# Patient Record
Sex: FEMALE | Race: WHITE
[De-identification: names, ages, dates, MRNs, and addresses within clinical notes are randomized per-mention and may not be internally consistent; named-entity substitution may affect disease eponyms.]

---

## 2019-02-15 ENCOUNTER — HOSPITAL ENCOUNTER (OUTPATIENT)
Dept: HOSPITAL 53 - M LABDRAW1 | Age: 74
End: 2019-02-15
Attending: ORTHOPAEDIC SURGERY
Payer: MEDICARE

## 2019-02-15 DIAGNOSIS — M13.851: Primary | ICD-10-CM

## 2019-02-15 LAB
INR PPP: 0.94
PROTHROMBIN TIME: 12.7 SECONDS (ref 12.1–14.4)

## 2019-02-15 NOTE — HPE
DATE OF ADMISSION:  02/22/2019

 

HISTORY OF PRESENT ILLNESS:  This is a pleasant female with continuing

symptomatic right hip osteoarthritis.  She has consented for a right total hip

arthroplasty per Dr. Héctor Reeves.  Medical optimization per Dr. Mala Fair.  X-rays are consistent with advanced osteoarthritis.

 

ALLERGIES:

-  BIAXIN

 

MEDICATIONS:

-  citalopram

-  hydrobromide 20 mg one by mouth every day

-  Benadryl Allergy 25 mg one to two caps by mouth every 4-6 hours as needed

-  glucosamine

-  Aleve 220 mg which she should discontinue as coached

-  rosuvastatin calcium 20 mg one by mouth every day

 

MEDICAL PROBLEM LIST:

1.  Symptomatic right hip osteoarthritis.

2.  Anxiety state.

3.  History of malignant neoplasm of colon.

4.  History of primary malignant neoplasm of the breast.

5.  Primary osteoarthritis pelvic region and thigh.

6.  Prosthetic arthroplasty left hip.

7.  Hyperlipidemia.

 

SURGICAL HISTORY:

1.  Left total hip arthroplasty.

2.  Breast lumpectomy.

3.  Colon cancer removed.

4.  Colonoscopy in 2014.

 

FAMILY HISTORY:  Positive for myocardial infarction (MI), breast cancer.

 

SOCIAL HISTORY:  She is a former smoker in college 50 years ago.  Occasionally

consumes alcohol.  Denies illicit drugs.

 

REVIEW OF SYSTEMS:  Denies chest pain, shortness of breath, dyspnea on exertion,

fever, chills, malaise, upper respiratory or urinary tract symptoms.

 

PHYSICAL EXAMINATION:

Vitals:  Height 5 feet and 1/2 inches.  Weight 160.2.  Temperature 97.6.

She is a pleasant, well developed, well nourished, overweight female in no acute

distress, alert and oriented times three.  Mood and affect are appropriate.

Normocephalic.

Neck supple.  Negative jugular venous distention (JVD) or bruits.

Lungs:  Clear to auscultation.

Chest:  Regular rate and rhythm.

Abdomen:  Soft.  Nontender times four, sounds times two.

Right hip range of motion is limited and antalgic throughout range of motion.

Bilateral lower extremities with skin intact, benign, noninfectious looking.

 

IMPRESSION:

1.  Symptomatic right hip osteoarthritis.

2.  Patient consented for a right total hip arthroplasty per Dr. Héctro Reeves.

3.  Medical optimization per Dr. Mala Fair.

4.  On call to operating room (OR) 2 grams IV Kefzol in OR.

5.  Sequential compression stockings and thromboembolic deterrent stockings in

OR.

MTDD

## 2019-02-22 ENCOUNTER — HOSPITAL ENCOUNTER (INPATIENT)
Dept: HOSPITAL 53 - M OR | Age: 74
LOS: 5 days | Discharge: HOME | DRG: 470 | End: 2019-02-27
Attending: ORTHOPAEDIC SURGERY | Admitting: ORTHOPAEDIC SURGERY
Payer: MEDICARE

## 2019-02-22 VITALS — DIASTOLIC BLOOD PRESSURE: 74 MMHG | SYSTOLIC BLOOD PRESSURE: 134 MMHG

## 2019-02-22 VITALS — BODY MASS INDEX: 31.6 KG/M2 | HEIGHT: 61 IN | WEIGHT: 167.4 LBS

## 2019-02-22 VITALS — SYSTOLIC BLOOD PRESSURE: 97 MMHG | DIASTOLIC BLOOD PRESSURE: 60 MMHG

## 2019-02-22 DIAGNOSIS — Z79.899: ICD-10-CM

## 2019-02-22 DIAGNOSIS — F41.9: ICD-10-CM

## 2019-02-22 DIAGNOSIS — Z87.891: ICD-10-CM

## 2019-02-22 DIAGNOSIS — E66.9: ICD-10-CM

## 2019-02-22 DIAGNOSIS — Z85.3: ICD-10-CM

## 2019-02-22 DIAGNOSIS — Z96.642: ICD-10-CM

## 2019-02-22 DIAGNOSIS — Z90.49: ICD-10-CM

## 2019-02-22 DIAGNOSIS — E78.5: ICD-10-CM

## 2019-02-22 DIAGNOSIS — I48.91: ICD-10-CM

## 2019-02-22 DIAGNOSIS — Z85.038: ICD-10-CM

## 2019-02-22 DIAGNOSIS — M16.11: Primary | ICD-10-CM

## 2019-02-22 DIAGNOSIS — G47.00: ICD-10-CM

## 2019-02-22 LAB
ALBUMIN SERPL BCG-MCNC: 2.8 GM/DL (ref 3.2–5.2)
ALT SERPL W P-5'-P-CCNC: 10 U/L (ref 12–78)
BILIRUB SERPL-MCNC: 0.7 MG/DL (ref 0.2–1)
BUN SERPL-MCNC: 9 MG/DL (ref 7–18)
CALCIUM SERPL-MCNC: 8.3 MG/DL (ref 8.8–10.2)
CHLORIDE SERPL-SCNC: 107 MEQ/L (ref 98–107)
CO2 SERPL-SCNC: 28 MEQ/L (ref 21–32)
CREAT SERPL-MCNC: 0.75 MG/DL (ref 0.55–1.3)
FT4I SERPL CALC-MCNC: 2.8 % (ref 1.3–4.8)
GFR SERPL CREATININE-BSD FRML MDRD: > 60 ML/MIN/{1.73_M2} (ref 39–?)
GLUCOSE SERPL-MCNC: 98 MG/DL (ref 70–100)
HCT VFR BLD AUTO: 34.5 % (ref 36–47)
HGB BLD-MCNC: 11.2 G/DL (ref 12–15.5)
MAGNESIUM SERPL-MCNC: 1.8 MG/DL (ref 1.8–2.4)
MCH RBC QN AUTO: 29.2 PG (ref 27–33)
MCHC RBC AUTO-ENTMCNC: 32.5 G/DL (ref 32–36.5)
MCV RBC AUTO: 89.8 FL (ref 80–96)
PHOSPHATE SERPL-MCNC: 3.4 MG/DL (ref 2.5–4.9)
PLATELET # BLD AUTO: 222 10^3/UL (ref 150–450)
POTASSIUM SERPL-SCNC: 4.3 MEQ/L (ref 3.5–5.1)
PROT SERPL-MCNC: 5.3 GM/DL (ref 6.4–8.2)
RBC # BLD AUTO: 3.84 10^6/UL (ref 4–5.4)
SODIUM SERPL-SCNC: 141 MEQ/L (ref 136–145)
T3RU NFR SERPL: 34 % (ref 30–39)
T4 SERPL-MCNC: 8.2 UG/DL (ref 4.5–12)
TSH SERPL DL<=0.005 MIU/L-ACNC: 0.85 UIU/ML (ref 0.36–3.74)
WBC # BLD AUTO: 7.5 10^3/UL (ref 4–10)

## 2019-02-22 PROCEDURE — 0SR902Z REPLACEMENT OF RIGHT HIP JOINT WITH METAL ON POLYETHYLENE SYNTHETIC SUBSTITUTE, OPEN APPROACH: ICD-10-PCS | Performed by: ORTHOPAEDIC SURGERY

## 2019-02-22 RX ADMIN — FENTANYL CITRATE PRN MCG: 50 INJECTION, SOLUTION INTRAMUSCULAR; INTRAVENOUS at 15:25

## 2019-02-22 RX ADMIN — ROSUVASTATIN CALCIUM SCH MG: 10 TABLET, FILM COATED ORAL at 20:28

## 2019-02-22 RX ADMIN — HYDROMORPHONE HYDROCHLORIDE PRN MG: 1 INJECTION, SOLUTION INTRAMUSCULAR; INTRAVENOUS; SUBCUTANEOUS at 15:55

## 2019-02-22 RX ADMIN — HYDROMORPHONE HYDROCHLORIDE PRN MG: 1 INJECTION, SOLUTION INTRAMUSCULAR; INTRAVENOUS; SUBCUTANEOUS at 15:45

## 2019-02-22 RX ADMIN — FENTANYL CITRATE PRN MCG: 50 INJECTION, SOLUTION INTRAMUSCULAR; INTRAVENOUS at 15:35

## 2019-02-22 RX ADMIN — CITALOPRAM HYDROBROMIDE SCH MG: 20 TABLET ORAL at 20:28

## 2019-02-22 RX ADMIN — MORPHINE SULFATE PRN MG: 4 INJECTION INTRAVENOUS at 18:07

## 2019-02-22 RX ADMIN — MORPHINE SULFATE PRN MG: 4 INJECTION INTRAVENOUS at 20:29

## 2019-02-22 RX ADMIN — FENTANYL CITRATE PRN MCG: 50 INJECTION, SOLUTION INTRAMUSCULAR; INTRAVENOUS at 15:40

## 2019-02-22 RX ADMIN — HYDROMORPHONE HYDROCHLORIDE PRN MG: 1 INJECTION, SOLUTION INTRAMUSCULAR; INTRAVENOUS; SUBCUTANEOUS at 16:08

## 2019-02-22 RX ADMIN — HYDROMORPHONE HYDROCHLORIDE PRN MG: 1 INJECTION, SOLUTION INTRAMUSCULAR; INTRAVENOUS; SUBCUTANEOUS at 15:50

## 2019-02-22 RX ADMIN — FENTANYL CITRATE PRN MCG: 50 INJECTION, SOLUTION INTRAMUSCULAR; INTRAVENOUS at 15:30

## 2019-02-22 RX ADMIN — HYDROMORPHONE HYDROCHLORIDE PRN MG: 1 INJECTION, SOLUTION INTRAMUSCULAR; INTRAVENOUS; SUBCUTANEOUS at 16:03

## 2019-02-22 RX ADMIN — SODIUM CHLORIDE, POTASSIUM CHLORIDE, SODIUM LACTATE AND CALCIUM CHLORIDE SCH MLS/HR: 600; 310; 30; 20 INJECTION, SOLUTION INTRAVENOUS at 15:15

## 2019-02-22 NOTE — ECGEPIP
Stationary ECG Study

                              [facility]

                                       

                                       Test Date:    2019

Pat Name:     TATIANA BARRAZA             Department:   

Patient ID:   [id number]                 Room:         [place] [address]

Gender:       F                        Technician:   YULIYA

:          1945               Requested By: Héctor Reeves 

Order Number: [id number]     Reading MD:   Davonte Nolan

                                 Measurements

Intervals                              Axis          

Rate:         69                       P:            62

NC:           133                      QRS:          69

QRSD:         87                       T:            35

QT:           417                                    

QTc:          449                                    

                           Interpretive Statements

Normal sinus rhythm

Low voltages with small inferior Q waves; body habitus versus pulmonary

disease.

Somewhat prominent R waves in V3 and V4; consider RVH versus prior PWMI.

Nonspecific ST/T-wave abnormalities

No prior tracing for comparison

Clinical correlation advised.

Electronically Signed On 2019 19:06:36 EST by Davonte Nolan

## 2019-02-22 NOTE — REP
PORTABLE RIGHT HIP, TWO VIEWS:

 

HISTORY:  Postoperative.

 

The patient is status post right total hip replacement.  There is no acute

fracture or dislocation.  Subcutaneous air and surgical staples are present in

the overlying soft tissue.

 

IMPRESSION:

 

The patient is status post right total hip replacement.  There is anatomic

alignment.

 

 

Electronically Signed by

David Mann MD 02/22/2019 03:21 P

## 2019-02-22 NOTE — IPN
DATE:  02/22/2019

 

The patient is seen and examined.  She wishes to go ahead with a right total hip

arthroplasty.  She is aware of the nature of the procedure and the risks of

bleeding, infection, damage to nerves, vessels, persistent pain, wear, loosening,

dislocation, leg length inequality, blood clots, medical problems, death among

others.  Preoperative clearance was obtained.  We are planning on doing a right

hip arthroplasty.

## 2019-02-22 NOTE — CR
DATE OF CONSULTATION: 02/22/2019

 

REQUESTING PROVIDER: CAMRYN Soto

 

REASON FOR CONSULTATION:  Medical management and new-onset atrial fibrillation.

 

HISTORY OF PRESENT ILLNESS:  This is a very pleasant 73-year-old female who is

status post a right total hip arthroplasty with Dr. Héctor Reeves.  She has never

had any past medical history of an irregular heartbeat.  Her only significant

past medical history is hyperlipidemia, for which she is on rosuvastatin 40 mg 
by

mouth daily. 



I spoke with Dr. Romano, anesthesiologist, in the postanesthesia

care unit (PACU). He states that she had atrial fibrillation that started after

her spinal and continued.  One of the drugs that was used for her anesthesia was

phenylephrine.  Dr. Romano also said that the P waves on the monitor looked 
like

they could have been multifocal.  She was given a fluid bolus during surgery, 
and

by the time she got to the PACU she had converted back into normal sinus rhythm.

EKG done in the PACU showed normal sinus rhythm at 70 beats per minute with a

normal axis.  She had a few other strips right before I saw her that looked like

they could have been atrial fibrillation; however, she was back into normal 
sinus

rhythm by the time I saw her.  She denies any chest pain, shortness of breath,

nausea, vomiting, dizziness, lightheadedness.  This has never happened before.

Dr. Romano suspects that she may have been somewhat dehydrated.  The patient

states that the last time she ate was yesterday afternoon.

 

REVIEW OF SYSTEMS:

CONSTITUTIONAL:  Denies any unexplained weight loss, fevers, chills, or changes

to appetite.

HEENT:  Denies any visual changes, headaches, runny nose, epistaxis, sinus pain,

tinnitus, sore throat, or odynophagia.

CARDIOVASCULAR:  Denies any chest pain, palpitations, paroxysmal nocturnal

dyspnea (PND), orthopnea, or edema.

RESPIRATORY:  Denies any cough, sputum production, wheezes, hemoptysis, or

shortness of breath.

GASTROINTESTINAL:  Denies any abdominal pain, nausea, vomiting, diarrhea,

constipation, obstipation, hematemesis, hematochezia, melena, or tenesmus.

GENITOURINARY:  Denies any incontinence, dysuria, hematuria, nocturia, polyuria.

MUSCULOSKELETAL:  Positive for right hip pain and history of left total hip

arthroplasty.  She has osteoarthritis.  She is status post right total hip

arthroplasty with Dr. Héctor Reeves.  She denies any other joint swelling,

arthritis, or crepitus.

INTEGUMENTARY:  Denies any pruritus, rashes, striatae, lesions, or wounds.

NEUROLOGIC: Denies any changes to sight/smell/hearing/taste, history of 
seizures,

headaches, paresthesias, numbness.

PSYCHIATRIC:  Admits to a history of anxiety and depression.  She denies

paranoia, anhedonia, or episodes of ashley.

ENDOCRINE:  Denies any mood swings, sweats, tremors, palpitations, visual

disturbances, constipation, dry skin, or polydipsia.

HEMATOLOGIC:  Denies any anemia, purpura, petechiae, or easy bruising/bleeding.

LYMPHATIC:  Denies any new lumps or bumps anywhere.

 

PAST MEDICAL HISTORY:

1.  Anxiety and depression.

2.  History of malignant neoplasm of the colon, resected 10 years ago.

Colonoscopy in 2014 was clean

3.  History of breast cancer, status post lumpectomy 20 years ago.

4.  Hyperlipidemia.

 

HOME MEDICATIONS:

- citalopram hydrobromide 20 mg by mouth daily

- Benadryl 25 mg at bedtime as needed for sleep

- rosuvastatin 40 mg by mouth daily

- Drisdol 50,000 units weekly, taken on Thursday.

- turmeric

 

PAST SURGICAL HISTORY

1.  Left total hip arthroplasty.

2.  Breast lumpectomy 20 years ago.

3.  Colon cancer removed 10 years ago.

4.  Colonoscopy in 2014 was clean.

 

FAMILY HISTORY:  Positive for myocardial infarction in her father, breast cancer

in her mother.

 

SOCIAL HISTORY:  The patient is a former smoker while in college 50 years ago.

Does not currently use any tobacco products.  Occasionally she will consume

alcohol, but this is rare, only once a month.  Denies any illicit drug use.  She

recently traveled to [place] in January to visit her grandchild.

 

PHYSICAL EXAMINATION:

VITAL SIGNS: Temperature 97 degrees Fahrenheit, pulse 69 and regular, 
respiratory

rate 18, blood pressure 124/67, 99% on room air.

GENERAL:  Pleasant, well-developed, well-nourished overweight e female in no

acute distress, seen PACU.

NEUROLOGIC:  She is alert and oriented times three.  No deficits to sensation.

Muscle strength 5/5 in the upper extremities.  Reflexes not tested.

PSYCHIATRIC:  Mood and affect are appropriate.

HEENT:  Normocephalic, atraumatic.  Mucous membranes are moist.  Pupils are

equal, round, and reactive to light.  Extraocular eye movements are intact.

NECK:  Supple.  No jugular venous distention (JVD).

LUNGS:  Clear to auscultation bilaterally.  No wheezes, rhonchi, or rales.

HEART:  Regular rate and rhythm.  No murmurs, gallops, or rubs.

ABDOMEN:  Soft.  No pain to palpation in all four quadrants.  Normoactive bowel

sounds are appreciated.

EXTREMITIES:  The patient is status post right hip arthroplasty.  Dressing is

clean and intact.  No bilateral lower extremity edema is noted.

SKIN:  No rashes, lesions, or areas of erythema.

 

LABORATORY DATA:

CBC:  WBC 7.5, hemoglobin 11.2, hematocrit 34.5, platelets 220.

 

Chemistry:  Sodium 141, potassium 4.3, chloride 107, carbon dioxide 28, BUN 9,

creatinine 0.75, fasting glucose 98, calcium 8.3, phosphorus 3.4, magnesium 1.8.

Total bilirubin 0.7, AST 15, ALT 10, alkaline phosphatase 45, total protein 5.3,

albumin 2.8.

 

IMAGING STUDIES

Hip x-ray done 02/22/2019 status post right total hip replacement shows anatomic

alignment.

 

EKG done in PACU shows sinus rhythm at 70 beats per minute within normal axis.

 

ASSESSMENT:

1.  Status post right total hip arthroplasty.  Pain management per Dr. Reeves.

 

2.  New-onset atrial fibrillation.  Causes may be multifactorial, including

hypovolemia, stress of surgery, and drugs used during surgery for anesthesia.  
As

she is converting spontaneously into sinus rhythm and patient is stable, she 
does

not need cardioversion.  The patient's MPT9QQ7-AUYo score is 2 or 3, depending 
on

whether or not hyperlipidemia is classified as vascular disease.  This puts her

at a 2.2-3.2% stroke risk per year.  This is only if her atrial fibrillation is

sustained.  We will put her on telemetry overnight to assess.

 

3.  Hyperlipidemia.  Continue the patient's home rosuvastatin.

 

4.  Anxiety.  Continue the patient's citalopram.

 

5.  Insomnia.  We will hold the patient's nightly Benadryl, as medications for

pain control may assist her in getting to sleep tonight.

 

6.  Deep vein thrombosis (DVT) prophylaxis.  Anticoagulation per orthopedics.

 

DISPOSITION:  Per orthopedics.  Patient will be followed by Dr. Montez starting

at 0700 hours on 02/23/2019.

 

Thank you for your consultation.  We will follow her along with you.

 

My faculty preceptor for this patient encounter was physically present during 
the

encounter and was fully available.  All aspects of the patient interview,

examination, medical decision making process, and medical care plan development

were reviewed and approved by the faculty preceptor. The faculty preceptor is

aware and concurs with the plan as stated in the body of this note and will

attest to such by his/her co-signature.

 



I have both independently examined this patient as well as reviewed the H&P. I 
have discussed in detail with the resident the findings and plan of treatment as
documented in the resident's note- Darlin Chaves MD

[facility]OV[facility]

## 2019-02-23 VITALS — SYSTOLIC BLOOD PRESSURE: 125 MMHG | DIASTOLIC BLOOD PRESSURE: 60 MMHG

## 2019-02-23 VITALS — SYSTOLIC BLOOD PRESSURE: 110 MMHG | DIASTOLIC BLOOD PRESSURE: 72 MMHG

## 2019-02-23 VITALS — DIASTOLIC BLOOD PRESSURE: 62 MMHG | SYSTOLIC BLOOD PRESSURE: 102 MMHG

## 2019-02-23 VITALS — DIASTOLIC BLOOD PRESSURE: 64 MMHG | SYSTOLIC BLOOD PRESSURE: 111 MMHG

## 2019-02-23 VITALS — SYSTOLIC BLOOD PRESSURE: 122 MMHG | DIASTOLIC BLOOD PRESSURE: 62 MMHG

## 2019-02-23 VITALS — DIASTOLIC BLOOD PRESSURE: 57 MMHG | SYSTOLIC BLOOD PRESSURE: 117 MMHG

## 2019-02-23 LAB
HCT VFR BLD AUTO: 33.6 % (ref 36–47)
HGB BLD-MCNC: 10.9 G/DL (ref 12–15.5)
MCH RBC QN AUTO: 29.2 PG (ref 27–33)
MCHC RBC AUTO-ENTMCNC: 32.4 G/DL (ref 32–36.5)
MCV RBC AUTO: 90.1 FL (ref 80–96)
PLATELET # BLD AUTO: 228 10^3/UL (ref 150–450)
RBC # BLD AUTO: 3.73 10^6/UL (ref 4–5.4)
WBC # BLD AUTO: 8.9 10^3/UL (ref 4–10)

## 2019-02-23 RX ADMIN — OXYCODONE HYDROCHLORIDE PRN MG: 15 TABLET, FILM COATED, EXTENDED RELEASE ORAL at 18:49

## 2019-02-23 RX ADMIN — SODIUM CHLORIDE, POTASSIUM CHLORIDE, SODIUM LACTATE AND CALCIUM CHLORIDE SCH MLS/HR: 600; 310; 30; 20 INJECTION, SOLUTION INTRAVENOUS at 04:26

## 2019-02-23 RX ADMIN — MORPHINE SULFATE PRN MG: 4 INJECTION INTRAVENOUS at 06:50

## 2019-02-23 RX ADMIN — CITALOPRAM HYDROBROMIDE SCH MG: 20 TABLET ORAL at 20:41

## 2019-02-23 RX ADMIN — DOCUSATE SODIUM,SENNOSIDES SCH TAB: 50; 8.6 TABLET, FILM COATED ORAL at 07:54

## 2019-02-23 RX ADMIN — MAGNESIUM HYDROXIDE SCH ML: 400 SUSPENSION ORAL at 07:54

## 2019-02-23 RX ADMIN — DOCUSATE SODIUM,SENNOSIDES SCH TAB: 50; 8.6 TABLET, FILM COATED ORAL at 20:41

## 2019-02-23 RX ADMIN — ROSUVASTATIN CALCIUM SCH MG: 10 TABLET, FILM COATED ORAL at 20:40

## 2019-02-23 RX ADMIN — RIVAROXABAN SCH MG: 10 TABLET, FILM COATED ORAL at 18:50

## 2019-02-23 RX ADMIN — MAGNESIUM HYDROXIDE SCH ML: 400 SUSPENSION ORAL at 08:13

## 2019-02-23 RX ADMIN — POLYETHYLENE GLYCOL 3350 SCH PKT: 17 POWDER, FOR SOLUTION ORAL at 07:56

## 2019-02-23 NOTE — ECGEPIP
Stationary ECG Study

                              [facility]

                                       

                                       Test Date:    2019

Pat Name:     TATIANA BARRAZA             Department:   

Patient ID:   [id number]                 Room:         [place] [address]

Gender:       F                        Technician:   YULISSA

:          1945               Requested By: SOHAM KIM 

Order Number: [id number]     Reading MD:   Davonte Nolan

                                 Measurements

Intervals                              Axis          

Rate:         68                       P:            70

CA:           128                      QRS:          60

QRSD:         84                       T:            46

QT:           410                                    

QTc:          439                                    

                           Interpretive Statements

Normal sinus rhythm

LA conduction disturbance

Not definitely outside normal limits.

No significant change from 19.

Electronically Signed On 2019 15:48:21 EST by Davonte Nolan

## 2019-02-23 NOTE — IPNPDOC
Text Note


Date of Service


The patient was seen on 2/23/19.





NOTE


Subjective:


Patient seen at bedside. She denies any chest pain, palpitations, shortness of 

breath, wheezes, cough, nausea, vomiting, diarrhea, fevers, or chills. She 

states that her only complaint is her right hip pain, she has postop day #1 from

total right hip arthroplasty.





Objective:


VITAL SIGNS: See below


GENERAL:  Pleasant, well-developed, well-nourished overweight female in no acute

distress


HEENT:  Normocephalic, atraumatic.  Mucous membranes are moist.  Pupils are 

equal, round, and reactive to light.  Extraocular eye movements are intact.


NECK:  Supple.  No jugular venous distention (JVD).


LUNGS:  Clear to auscultation bilaterally.  No wheezes, rhonchi, or rales.


HEART:  Regular rate and rhythm.  No murmurs, gallops, or rubs.


ABDOMEN:  Soft.  No pain to palpation in all four quadrants.  Normoactive bowel 

sounds are appreciated.


EXTREMITIES:  The patient is status post right hip arthroplasty.  Dressing is 

clean and intact.  No bilateral lower extremity edema is noted.


SKIN:  No rashes, lesions, or areas of erythema.


 


LABORATORY DATA: See below





TELEMETRY: 5 or 6 arrhythmic strips overnight on telemetry, mostly sinus rhythm.


 


ASSESSMENT:


1.  Status post right total hip arthroplasty.  Pain management per Dr. Reeves.


 


2.  Arrhythmia. All EKGs that the patient has has been in normal sinus rhythm. 

She had about 5 or 6 arrhythmic strips overnight on telemetry, which may be 

representative of sinus arrhythmia. Causes may be multifactorial, including 

hypovolemia, stress of surgery, and drugs used during surgery for anesthesia.  

As she is mostly in sinus rhythm and patient is stable, she does not need 

cardioversion at this time.  The patient's OFI4AS7-LTOc score is 2 or 3, 

depending on whether or not hyperlipidemia is classified as vascular disease.  

This puts her at a 2.2-3.2% stroke risk per year.  This is only if she has 

atrial fibrillation, we will get morning EKGs while she is here. She may need 

outpatient cardiology follow-up, for potential Holter monitor.


 


3.  Hyperlipidemia.  Continue the patient's home rosuvastatin.


 


4.  Anxiety.  Continue the patient's citalopram.


 


5.  Insomnia.  We will hold the patient's nightly Benadryl, as medications for 

pain control may assist her in getting to sleep tonight.


 


6.  Deep vein thrombosis (DVT) prophylaxis.  Anticoagulation per orthopedics.


 


DISPOSITION:  


Per orthopedics.





VS,Leila, I+O


VS, Fishbone, I+O


Laboratory Tests


2/22/19 15:11








Red Blood Count 3.84 L, Mean Corpuscular Volume 89.8, Mean Corpuscular 

Hemoglobin 29.2, Mean Corpuscular Hemoglobin Concent 32.5, Red Cell Distribution

Width 12.7, Calcium Level 8.3 L, Phosphorus Level 3.4, Aspartate Amino Transf 

(AST/SGOT) 15, Alanine Aminotransferase (ALT/SGPT) 10 L, Alkaline Phosphatase 

45, Total Bilirubin 0.7, Total Protein 5.3 L, Albumin 2.8 L





2/23/19 06:27








Red Blood Count 3.73 L, Mean Corpuscular Volume 90.1, Mean Corpuscular 

Hemoglobin 29.2, Mean Corpuscular Hemoglobin Concent 32.4, Red Cell Distribution

Width 12.5








Vital Signs








  Date Time  Temp Pulse Resp B/P (MAP) Pulse Ox O2 Delivery O2 Flow Rate FiO2


 


2/23/19 10:00 98.5 69 15 111/64 (96) 94   














I&O- Last 24 Hours up to 6 AM 


 


 2/23/19





 05:59


 


Intake Total 1525 ml


 


Output Total 1000 ml


 


Balance 525 ml

















MARGARITA SHELL DO             Feb 23, 2019 10:59

## 2019-02-23 NOTE — IPN
DATE:  02/23/2019

 

CHIEF COMPLAINT

Postoperative day #1 right total hip arthroplasty.

 

HISTORY OF PRESENT ILLNESS:

This 73-year-old female underwent a right total hip arthroplasty by Dr. Reeves

yesterday for arthritis.  She is seen today in the [facility].  Apparently she did have

an intraoperative episode of rapid heart rate and so she is on telemetry

currently and being monitored for that.  Other than that she has not had any

chest pain, shortness of breath, cough, systemic symptoms, problem with nausea or

vomiting. Other that her pain on the right side she is doing well and

appropriate.

 

PHYSICAL EXAMINATION

Vital signs this morning: Temperature 96.3.  Blood pressure 110/72.  Pulse rate

64.  96% on room air.  Respiratory rate 17.

 

She is alert and oriented times three. Mood and affect a little bit

confrontational as she is wondering where Dr. Reeves was today, but I explained the

situation and she responded appropriately.  Right hip still has bulky dressing in

situ.  Five compartments are soft.  No strike-through on the dressing.  She is

able to wiggle her toes and dorsiflex and plantar flex both her feet.  Her feet

are warm and well perfused with good pedal pulses.  Normal sensation throughout

the feet.

 

When we saw her she was having an EKG done.  She was also using a bed pan.

 

Laboratory examination revealed hemoglobin to 10.9 down from 11.2 preoperatively.

Chemistries: Fairly normal electrolyte panel.

 

ASSESSMENT/PLAN

This 73-year-old female is being managed by the hospitalists and we appreciate

their expert advice and opinion.  They do need to be monitoring in terms of her

cardiac function apparently and there was an EKG done this morning which will be

interpreted by the hospitalist and help with the medical management.  She should

get up, weightbearing as tolerated.  Hopefully the nurses can get her up to the

commode and help with her pain management.  For VTE prophylaxis she is Xarelto 10

mg by mouth (p.o.) once daily. We will change dressing today to Optifoam as well.

## 2019-02-23 NOTE — RO
DATE OF PROCEDURE:  02/22/2019

 

PREOPERATIVE DIAGNOSIS:  Right hip osteoarthritis.

 

POSTOPERATIVE DIAGNOSIS:  Right hip osteoarthritis.

 

PROCEDURE:  Right total hip arthroplasty using a Tripp size 5 high offset, +5 36

ball and a 52 cup polyethylene liner.

 

SURGEON:  Dr. Héctor Reeves

 

FIRST ASSISTANT:  Romulo Sandoval PA-C.

 

ANESTHESIA:  Spinal

 

ESTIMATED BLOOD LOSS:  200 mL.

 

COMPLICATIONS:  None.

 

INDICATIONS:  This is a 73-year-old woman has had gradually worsening right hip

pain.  She wished to ahead with a hip replacement having failed conservative

management and understood the nature and the risks of this including bleeding,

infection, damage to nerves, vessels, persistent pain, wear, loosening,

dislocation, leg length inequality, blood clots, medical problems, death among

others.

 

DESCRIPTION OF PROCEDURE:  The patient was taken to the operating room and placed

in the left lateral decubitus position on the [place] positioner.  All areas

were padded appropriately.  The right hip was prepped and draped in the usual

sterile fashion.  Time-out was performed.

 

I then created longitudinal incision over the lateral aspect of the right hip and

sharp dissection was carried down through subcutaneous tissue until the fascia

jerrell was encountered.  This was incised longitudinally and I gradually exposed

the abductors.  The approximately anterior 40% of the abductors were carefully

divided off of the femur exposing the femoral neck to the point where once I had

released enough, I was able to dislocate the hip without difficulty with the

assistant's help.  I then used a canal initiating reamer followed by the canal

finding reamer, the lateralizing reamer and sequentially reamed up to a size

five, which had good bony purchase.  The neck was then cut at about

three-quarters of a fingerbreadth up from the lesser trochanter and she did have

severe arthritis.  I then prepared the acetabulum with anterior-posterior

retractors and carefully reamed up to a size 51, which had excellent concentric

reaming and bleeding bone, impacted in a 52 cup in the appropriate amount of

anteversion horizontal tilt and made sure it was well seated.  The apex hole

eliminator was placed.  I removed some osteophytes from anterior and posterior,

then placed the 36 x 52 acetabular liner, impacted this in place and made sure it

was seated.  I then broached up to a size 5, which had excellent fit and fill.  I

used some trial neck combinations and decided on the high offset +5 head and neck

combination which had excellent stability minimal shuck in full extension.  No

impingement.  I had irrigated multiple times prior to placement of the cup.  I

then copiously irrigated the canal and selected the size 5 high offset Tripp

stem which was then impacted in place and made sure it was well seated.  I then

placed the +5 36 ball, impacted this over the taper, made sure it was well

secured and then reduced the hip with the assistant's help with the hip through

range of motion.  I was very pleased with the position and stability of the hip.

 

 

I irrigated again copiously.  Placed some TXA solution and closed the deep layer

with #1-0 Vicryl suture, and the abductor with #1-0 Vicryl suture through bony

holes. I then irrigated, closed the fascia jerrell with #1-0 Vicryl suture in a

running Stratafix suture obtaining a watertight closure.  Again irrigated, closed

the subcutaneous tissue in two layers.  She did have significant obesity, which

made this more difficult.  Sterile dressing was applied after the staples were

placed and she was taken to recovery in stable condition. There were no known

complications.  The plan will be routine postop.

 

The assistant was instrumental in holding retractors and assisting reducing and

dislocating the hip and assisting in wound closure.

## 2019-02-24 VITALS — SYSTOLIC BLOOD PRESSURE: 119 MMHG | DIASTOLIC BLOOD PRESSURE: 59 MMHG

## 2019-02-24 VITALS — DIASTOLIC BLOOD PRESSURE: 66 MMHG | SYSTOLIC BLOOD PRESSURE: 96 MMHG

## 2019-02-24 VITALS — DIASTOLIC BLOOD PRESSURE: 58 MMHG | SYSTOLIC BLOOD PRESSURE: 121 MMHG

## 2019-02-24 VITALS — SYSTOLIC BLOOD PRESSURE: 160 MMHG | DIASTOLIC BLOOD PRESSURE: 71 MMHG

## 2019-02-24 VITALS — SYSTOLIC BLOOD PRESSURE: 126 MMHG | DIASTOLIC BLOOD PRESSURE: 65 MMHG

## 2019-02-24 RX ADMIN — MAGNESIUM HYDROXIDE SCH ML: 400 SUSPENSION ORAL at 09:58

## 2019-02-24 RX ADMIN — POLYETHYLENE GLYCOL 3350 SCH PKT: 17 POWDER, FOR SOLUTION ORAL at 09:58

## 2019-02-24 RX ADMIN — ACETAMINOPHEN SCH MG: 500 TABLET ORAL at 17:47

## 2019-02-24 RX ADMIN — ROSUVASTATIN CALCIUM SCH MG: 10 TABLET, FILM COATED ORAL at 20:21

## 2019-02-24 RX ADMIN — DOCUSATE SODIUM,SENNOSIDES SCH TAB: 50; 8.6 TABLET, FILM COATED ORAL at 20:22

## 2019-02-24 RX ADMIN — DOCUSATE SODIUM,SENNOSIDES SCH TAB: 50; 8.6 TABLET, FILM COATED ORAL at 09:58

## 2019-02-24 RX ADMIN — OXYCODONE HYDROCHLORIDE PRN MG: 15 TABLET, FILM COATED, EXTENDED RELEASE ORAL at 05:07

## 2019-02-24 RX ADMIN — ACETAMINOPHEN SCH MG: 500 TABLET ORAL at 20:22

## 2019-02-24 RX ADMIN — CITALOPRAM HYDROBROMIDE SCH MG: 20 TABLET ORAL at 20:22

## 2019-02-24 RX ADMIN — OXYCODONE HYDROCHLORIDE PRN MG: 15 TABLET, FILM COATED, EXTENDED RELEASE ORAL at 12:40

## 2019-02-24 RX ADMIN — RIVAROXABAN SCH MG: 10 TABLET, FILM COATED ORAL at 17:47

## 2019-02-24 NOTE — IPNPDOC
Date Seen


The patient was seen on 2/24/19.





Progress Note


Subjective: Patient feels well she tells me she is having bowel movements she 

denies palpitations lightheadedness dizziness or shortness of breath and has no 

complaints





Objective:


VITAL SIGNS: See below


GENERAL:  Pleasant, overweight female lying comfortably in bed awake alert 

oriented 3 in no acute distress


HEENT:  Normocephalic, atraumatic.  Mucous membranes are moist.  Pupils are 

equal, round, and reactive to light.  Extraocular eye movements are intact.


NECK:  Supple.  No jugular venous distention (JVD).


LUNGS:  Clear to auscultation bilaterally.  No wheezes, rhonchi, or rales.


HEART:  Regular rate and rhythm.  No murmurs, gallops, or rubs.


ABDOMEN:  Soft.  No pain to palpation in all four quadrants.  Normoactive bowel 

sounds are appreciated.


EXTREMITIES:  The patient is status post right hip arthroplasty.  Dressing is 

clean and intact.  No bilateral lower extremity edema is noted.


 


LABORATORY DATA: See below





TELEMETRY: Significant artifact but no definite arrhythmia


 


ASSESSMENT:


1. Postop day 2 right total hip arthroplasty. Perioperative management as per 

orthopedic surgery


 


2.  Arrhythmia. All EKGs that the patient has has been in normal sinus rhythm. 

She had about 5 or 6 arrhythmic strips overnight on telemetry, which may be 

representative of sinus arrhythmia. Causes may be multifactorial, including 

anesthesia adverse effect versus stress of surgery. In the last 24 hours \there 

being no further significant events noted continue to monitor telemetry and 

follow up echocardiogram however this may be simply related to surgery and have 

resolved. If she has further recurrence consider outpatient cardiology referral 

for possible Holter placement no definite A. fib noted on EKGs


 


3.  Hyperlipidemia.  Continue the patient's home rosuvastatin.


 


4.  Anxiety.  Continue the patient's citalopram.


 


5.  Insomnia.  We will hold the patient's nightly Benadryl, as medications for 

pain control may assist her in getting to sleep tonight.


 


6.  Deep vein thrombosis (DVT) prophylaxis.  Anticoagulation per orthopedics.





VS, I&O, 24H, Fishbone


Vital Signs/I&O





Vital Signs








  Date Time  Temp Pulse Resp B/P (MAP) Pulse Ox O2 Delivery O2 Flow Rate FiO2


 


2/24/19 06:00 98.9 76 17 160/71 (100) 93   














I&O- Last 24 Hours up to 6 AM 


 


 2/24/19





 06:00


 


Intake Total 420 ml


 


Output Total 400 ml


 


Balance 20 ml

















SOHAM KIM MD              Feb 24, 2019 09:50

## 2019-02-24 NOTE — IPN
DATE: 02/24/2019

 

CHIEF COMPLAINT

Right hip total hip arthroplasty (CALVIN), postoperative day 2

 

HISTORY OF PRESENT ILLNESS

This is a 73-year-old female seen today on the [facility], now 2 days out from her

right total hip arthroplasty performed by Dr. Héctor Reeves.  She is doing better

today.  Pain appear better controlled.  We did order her some Oxycodone

medication it seems to be controlling her pain a little bit better.  She is also

on the Celexa and acetaminophen for pain control.  She is ambulating up to the

washroom with the help of the nurses.  The nurses have no specific concerns.

 

PHYSICAL EXAMINATION

Today temperature 98.9.  Blood pressure 160/71, pulse rate 76.  93% on room air,

respiratory rate 17.

 

She is alert and oriented times three.  She supine in bed.  She is trying to get

up to the side of the bed.  Right and left lower extremities appear normal.

Thigh compartments are soft.  Dressing was changed to Optifoam dressing.  It is

dry with no strike through.  She is able to wiggle her toes, dorsiflex, plantar

flex her feet on both sides.  Both feet are warm and well perfused.  Good pedal

pulses.

 

Laboratory examination revealed hemoglobin 10.9 down from 11.2.  This was

immediately postoperatively.  No blood work from this morning.

 

ASSESSMENT/PLAN

This 73-year-old female should mobilize and weightbearing as tolerated and be

discharged home when she is comfortable and safely mobilizing.  Venous

thromboembolism (VTE) prophylaxis with Xarelto 10 mg by mouth once daily and

follow up with Dr. Reeves.

## 2019-02-24 NOTE — ECHO
DATE OF PROCEDURE:  02/23/2019

 

YOB: 1945

AGE:  73

GENDER:  Female

HEIGHT:  61 inches

WEIGHT:  167 pounds

BODY SURFACE AREA:  1.75 m2

INPATIENT:  [address] [place] [address], room 4222

REFERRING PHYSICIAN:  Dr. Dunia Landaverde

INDICATION:  Abnormal EKG.

 

MEASUREMENTS:

2-D Measurements:

RV:  4.0 cm

LV:  3.6 cm

Septum:  0.9 cm

Posterior wall:  0.9 cm

Aortic root:  2.9 cm

LA:  3.6 cm

LVEF:  65%

 

Doppler Measurements:

AV:  1.36 m/s

LVOT:  1.19 m/s

LVOT diameter:  1.8 cm

MV:  E 110, A 140, EA ratio 0.8

Early mitral deceleration time:  225 ms

E prime:  7

A prime:  7

E/E prime ratio:  15.7

PV:  Could not be measured.

IVC:  1.1cm

 

COMMENTS:  Normal sinus rhythm without intraventricular conduction disturbance.

 

Technically difficult study in light of the patient's body habitus but some

diagnostically useful information was still obtained.

 

M-mode and two-dimensional echocardiography was performed with pulsed, 
continuous

wave, color flow and tissue Doppler studies.

 

Normal-appearing left ventricular size, wall thickness and wall motion.



Left atrium upper limits of normal to slightly dilated with Doppler evidence of

an impairment of LV diastolic function and estimated mean left atrial pressure

upper limits of normal.



Normal right heart chamber sizes and motion with current estimated right

ventricular systolic pressure upper limits of normal.



Normal IVC size against an elevated central venous pressure.



Aortic valvular sclerosis without functional abnormality.



Normal aortic root size.



Moderately severe mitral annular calcification without significant inflow tract 
obstruction

(mean transvalvular diastolic gradient 4 mmHg) and only trace insufficiency.



Normal appearing tricuspid valve with only very mild insufficiency.



No apparent intracardiac mass or pericardial effusion.

MTDD

## 2019-02-25 VITALS — SYSTOLIC BLOOD PRESSURE: 138 MMHG | DIASTOLIC BLOOD PRESSURE: 72 MMHG

## 2019-02-25 VITALS — DIASTOLIC BLOOD PRESSURE: 60 MMHG | SYSTOLIC BLOOD PRESSURE: 109 MMHG

## 2019-02-25 VITALS — SYSTOLIC BLOOD PRESSURE: 133 MMHG | DIASTOLIC BLOOD PRESSURE: 61 MMHG

## 2019-02-25 RX ADMIN — CITALOPRAM HYDROBROMIDE SCH MG: 20 TABLET ORAL at 20:54

## 2019-02-25 RX ADMIN — ROSUVASTATIN CALCIUM SCH MG: 10 TABLET, FILM COATED ORAL at 20:54

## 2019-02-25 RX ADMIN — POLYETHYLENE GLYCOL 3350 SCH PKT: 17 POWDER, FOR SOLUTION ORAL at 08:48

## 2019-02-25 RX ADMIN — RIVAROXABAN SCH MG: 10 TABLET, FILM COATED ORAL at 16:52

## 2019-02-25 RX ADMIN — ACETAMINOPHEN SCH MG: 500 TABLET ORAL at 20:54

## 2019-02-25 RX ADMIN — DOCUSATE SODIUM,SENNOSIDES SCH TAB: 50; 8.6 TABLET, FILM COATED ORAL at 20:54

## 2019-02-25 RX ADMIN — DOCUSATE SODIUM,SENNOSIDES SCH TAB: 50; 8.6 TABLET, FILM COATED ORAL at 08:48

## 2019-02-25 RX ADMIN — ACETAMINOPHEN SCH MG: 500 TABLET ORAL at 16:52

## 2019-02-25 RX ADMIN — MAGNESIUM HYDROXIDE SCH ML: 400 SUSPENSION ORAL at 08:48

## 2019-02-25 RX ADMIN — ACETAMINOPHEN SCH MG: 500 TABLET ORAL at 08:48

## 2019-02-25 NOTE — IPNPDOC
Text Note


Date of Service


The patient was seen on 2/25/19.





NOTE


Subjective: Patient feels well she tells me she feels well. She is having bowel 

movements; but denies palpitations, lightheadedness, dizziness or shortness of 

breath. She has no complaints today





Objective:


VITAL SIGNS: See below


GENERAL:  Pleasant, overweight female lying comfortably in bed awake alert 

oriented 3 in no acute distress


HEENT:  Normocephalic, atraumatic.  Mucous membranes are moist.  Pupils are 

equal, round, and reactive to light.  Extraocular eye movements are intact.


NECK:  Supple.  No jugular venous distention (JVD).


LUNGS:  Clear to auscultation bilaterally.  No wheezes, rhonchi, or rales.


HEART:  Regular rate and rhythm.  No murmurs, gallops, or rubs.


ABDOMEN:  Soft.  No pain to palpation in all four quadrants.  Normoactive bowel 

sounds are appreciated.


EXTREMITIES:  The patient is status post right hip arthroplasty.  Dressing is 

clean and intact.  No bilateral lower extremity edema is noted.


 


LABORATORY DATA: See below





TELEMETRY: Significant artifact, no definite arrhythmia. One event of sinus ta

chycardiac over night, not concerning for arrhythmia.


 


ASSESSMENT:


1. Postop day 4 right total hip arthroplasty. Perioperative management as per 

orthopedic surgery


 


2.  Arrhythmia. All EKGs that the patient has has been in normal sinus rhythm. 

Causes may be multifactorial, including anesthesia adverse effect versus stress 

of surgery. In the last 24 hours there were no further significant events noted 

continue to monitor telemetry and follow up echocardiogram was normal. This may 

be simply related to surgery and have resolved. If she has further recurrence 

consider outpatient cardiology referral for possible Holter placement. There has

been no definite A. fib noted on EKGs


 


3.  Hyperlipidemia.  Continue the patient's home rosuvastatin.


 


4.  Anxiety.  Continue the patient's citalopram.


 


5.  Insomnia.  We will hold the patient's nightly Benadryl, as medications for 

pain control may assist her in getting to sleep tonight.


 


6.  Deep vein thrombosis (DVT) prophylaxis.  Anticoagulation per orthopedics.





DISPOSITION: Per orthopedics, stable for discharge





VS,Fishbone, I+O


VS, Fishbone, I+O





Vital Signs








  Date Time  Temp Pulse Resp B/P (MAP) Pulse Ox O2 Delivery O2 Flow Rate FiO2


 


2/25/19 06:00 98.6 87 17 133/61 (85) 98   














I&O- Last 24 Hours up to 6 AM 


 


 2/25/19





 06:00


 


Intake Total 1240 ml


 


Output Total 2300 ml


 


Balance -1060 ml











GME ATTESTATION


GME ATTESTATION


My faculty preceptor for this patient encounter was physically present during 

the encounter and was fully available. All aspects of the patient interview, 

examination, medical decision making process, and medical care plan development 

were reviewed and approved by the faculty preceptor. The faculty preceptor is 

aware and concurs with the plan as stated in the body of this note and will 

attest to such by his/her cosignature.











MARGARITA SHELL DO             Feb 25, 2019 07:17

## 2019-02-26 VITALS — DIASTOLIC BLOOD PRESSURE: 55 MMHG | SYSTOLIC BLOOD PRESSURE: 103 MMHG

## 2019-02-26 VITALS — DIASTOLIC BLOOD PRESSURE: 67 MMHG | SYSTOLIC BLOOD PRESSURE: 125 MMHG

## 2019-02-26 VITALS — SYSTOLIC BLOOD PRESSURE: 122 MMHG | DIASTOLIC BLOOD PRESSURE: 68 MMHG

## 2019-02-26 VITALS — SYSTOLIC BLOOD PRESSURE: 132 MMHG | DIASTOLIC BLOOD PRESSURE: 62 MMHG

## 2019-02-26 VITALS — SYSTOLIC BLOOD PRESSURE: 126 MMHG | DIASTOLIC BLOOD PRESSURE: 68 MMHG

## 2019-02-26 VITALS — DIASTOLIC BLOOD PRESSURE: 74 MMHG | SYSTOLIC BLOOD PRESSURE: 142 MMHG

## 2019-02-26 RX ADMIN — MAGNESIUM HYDROXIDE SCH ML: 400 SUSPENSION ORAL at 08:15

## 2019-02-26 RX ADMIN — ACETAMINOPHEN SCH MG: 500 TABLET ORAL at 20:12

## 2019-02-26 RX ADMIN — POLYETHYLENE GLYCOL 3350 SCH PKT: 17 POWDER, FOR SOLUTION ORAL at 08:15

## 2019-02-26 RX ADMIN — DOCUSATE SODIUM,SENNOSIDES SCH TAB: 50; 8.6 TABLET, FILM COATED ORAL at 08:15

## 2019-02-26 RX ADMIN — CITALOPRAM HYDROBROMIDE SCH MG: 20 TABLET ORAL at 20:11

## 2019-02-26 RX ADMIN — ACETAMINOPHEN SCH MG: 500 TABLET ORAL at 08:15

## 2019-02-26 RX ADMIN — RIVAROXABAN SCH MG: 10 TABLET, FILM COATED ORAL at 16:49

## 2019-02-26 RX ADMIN — DOCUSATE SODIUM,SENNOSIDES SCH TAB: 50; 8.6 TABLET, FILM COATED ORAL at 20:12

## 2019-02-26 RX ADMIN — ROSUVASTATIN CALCIUM SCH MG: 10 TABLET, FILM COATED ORAL at 20:10

## 2019-02-26 RX ADMIN — ACETAMINOPHEN SCH MG: 500 TABLET ORAL at 16:48

## 2019-02-27 VITALS — DIASTOLIC BLOOD PRESSURE: 76 MMHG | SYSTOLIC BLOOD PRESSURE: 158 MMHG

## 2019-02-27 VITALS — SYSTOLIC BLOOD PRESSURE: 156 MMHG | DIASTOLIC BLOOD PRESSURE: 71 MMHG

## 2019-02-27 RX ADMIN — ACETAMINOPHEN SCH MG: 500 TABLET ORAL at 10:16

## 2019-02-27 RX ADMIN — DOCUSATE SODIUM,SENNOSIDES SCH TAB: 50; 8.6 TABLET, FILM COATED ORAL at 10:15

## 2019-02-27 RX ADMIN — MAGNESIUM HYDROXIDE SCH ML: 400 SUSPENSION ORAL at 10:15

## 2019-02-27 RX ADMIN — POLYETHYLENE GLYCOL 3350 SCH PKT: 17 POWDER, FOR SOLUTION ORAL at 10:15

## 2019-12-12 ENCOUNTER — HOSPITAL ENCOUNTER (OUTPATIENT)
Dept: HOSPITAL 53 - M OPP | Age: 74
Discharge: HOME | End: 2019-12-12
Attending: SURGERY
Payer: MEDICARE

## 2019-12-12 VITALS — BODY MASS INDEX: 32.85 KG/M2 | HEIGHT: 61 IN | WEIGHT: 174 LBS

## 2019-12-12 VITALS — SYSTOLIC BLOOD PRESSURE: 130 MMHG | DIASTOLIC BLOOD PRESSURE: 72 MMHG

## 2019-12-12 DIAGNOSIS — Z92.21: ICD-10-CM

## 2019-12-12 DIAGNOSIS — Z79.899: ICD-10-CM

## 2019-12-12 DIAGNOSIS — Z92.3: ICD-10-CM

## 2019-12-12 DIAGNOSIS — Z88.1: ICD-10-CM

## 2019-12-12 DIAGNOSIS — K63.5: ICD-10-CM

## 2019-12-12 DIAGNOSIS — Z85.3: ICD-10-CM

## 2019-12-12 DIAGNOSIS — Z85.038: ICD-10-CM

## 2019-12-12 DIAGNOSIS — Z12.11: Primary | ICD-10-CM

## 2019-12-12 NOTE — ROOR
________________________________________________________________________________

Patient Name: Ute Barbour             Procedure Date: 12/12/2019 1:07 PM

MRN: [id number]                          Account Number: [id number]

YOB: 1945                Age: 74

Room: [facility]                            Gender: Female

Note Status: Finalized                 

________________________________________________________________________________

 

Procedure:           Colonoscopy

Indications:         Personal history of malignant neoplasm of the colon

Providers:           Leo J. Gosselin Jr, MD

Referring MD:        Mala OLIVSA MD

Requesting Provider: 

Medicines:           Propofol per Anesthesia

Complications:       No immediate complications.

________________________________________________________________________________

Procedure:           Pre-Anesthesia Assessment:

                     - Prior to the procedure, a History and Physical was 

                     performed, and patient medications and allergies were 

                     reviewed. The patient is competent. The risks and 

                     benefits of the procedure and the sedation options and 

                     risks were discussed with the patient. All questions were 

                     answered and informed consent was obtained. Patient 

                     identification and proposed procedure were verified by 

                     the physician and the nurse in the pre-procedure area and 

                     in the procedure room. Mental Status Examination: alert 

                     and oriented. Airway Examination: normal oropharyngeal 

                     airway and neck mobility. Respiratory Examination: clear 

                     to auscultation. CV Examination: normal. ASA Grade 

                     Assessment: II - A patient with mild systemic disease. 

                     After reviewing the risks and benefits, the patient was 

                     deemed in satisfactory condition to undergo the 

                     procedure. The anesthesia plan was to use moderate 

                     sedation / analgesia (conscious sedation). Immediately 

                     prior to administration of medications, the patient was 

                     re-assessed for adequacy to receive sedatives. The heart 

                     rate, respiratory rate, oxygen saturations, blood 

                     pressure, adequacy of pulmonary ventilation, and response 

                     to care were monitored throughout the procedure. The 

                     physical status of the patient was re-assessed after the 

                     procedure.

                     The Colonoscope was introduced through the anus and 

                     advanced to the ileocolonic anastomosis. The quality of 

                     the bowel preparation was good.

                                                                                

Findings:

     The rectum, sigmoid colon, descending colon, transverse colon and 

     anastomosis appeared normal.

     Two polyps were found in the recto-sigmoid colon and ascending colon. The 

     polyps were small in size. These polyps were removed with a cold snare. 

     Resection and retrieval were complete.

                                                                                

Impression:          - The rectum, sigmoid colon, descending colon, transverse 

                     colon and colonic anastomosis are normal.

                     - Two small polyps at the recto-sigmoid colon and in the 

                     ascending colon, removed with a cold snare. Resected and 

                     retrieved.

Recommendation:      - Repeat colonoscopy in 5 years for surveillance.

                                                                                

 

Leo Gosselin MD

_____________________

Leo J Gosselin Jr, MD

12/12/2019 1:42:07 PM

Electronically signed by Leo Gosselin Jr , MD

Number of Addenda: 0

 

Note Initiated On: 12/12/2019 1:07 PM

Estimated Blood Loss:

     Estimated blood loss: none.
